# Patient Record
Sex: FEMALE
[De-identification: names, ages, dates, MRNs, and addresses within clinical notes are randomized per-mention and may not be internally consistent; named-entity substitution may affect disease eponyms.]

---

## 2024-06-27 ENCOUNTER — DASHBOARD ENCOUNTERS (OUTPATIENT)
Age: 69
End: 2024-06-27

## 2024-06-27 ENCOUNTER — OFFICE VISIT (OUTPATIENT)
Dept: URBAN - METROPOLITAN AREA CLINIC 113 | Facility: CLINIC | Age: 69
End: 2024-06-27
Payer: COMMERCIAL

## 2024-06-27 VITALS
DIASTOLIC BLOOD PRESSURE: 79 MMHG | HEIGHT: 64 IN | RESPIRATION RATE: 18 BRPM | WEIGHT: 188 LBS | SYSTOLIC BLOOD PRESSURE: 116 MMHG | BODY MASS INDEX: 32.1 KG/M2 | TEMPERATURE: 98.1 F | HEART RATE: 67 BPM

## 2024-06-27 DIAGNOSIS — K64.1 GRADE II HEMORRHOIDS: ICD-10-CM

## 2024-06-27 DIAGNOSIS — K60.2 ANAL FISSURE: ICD-10-CM

## 2024-06-27 PROCEDURE — 99203 OFFICE O/P NEW LOW 30 MIN: CPT | Performed by: STUDENT IN AN ORGANIZED HEALTH CARE EDUCATION/TRAINING PROGRAM

## 2024-06-27 NOTE — HPI-TODAY'S VISIT:
Initial visit 6/27/2024; PCP Dr. Mara Oneill Ms. Bernardo is a 68-year-old female here today to discuss management of hemorrhoids.  She states that she has been having rectal symptoms for the past few weeks, she feels that she has internal hemorrhoids.  She states that she had a colonoscopy within the last 10 years that revealed internal hemorrhoid, she reports no polyps, this report is not available for my review.  Her rectal symptoms include bleeding, itching and burning.  She also states that she feels as if stool can sometimes be hard to pass.

## 2024-06-27 NOTE — PHYSICAL EXAM GASTROINTESTINAL
Abdomen , soft, nontender, nondistended , no guarding or rigidity , no masses palpable , normal bowel sounds , Liver and Spleen , no hepatomegaly present , no hepatosplenomegaly , liver nontender , spleen not palpable , Rectal , anal fissure present at 2'o clock, external skin tags, no external hemorrhoids, internal hemorrhoids palpated, no masses on ARLIN

## 2024-07-02 ENCOUNTER — OFFICE VISIT (OUTPATIENT)
Dept: URBAN - METROPOLITAN AREA CLINIC 113 | Facility: CLINIC | Age: 69
End: 2024-07-02

## 2024-08-26 ENCOUNTER — OFFICE VISIT (OUTPATIENT)
Dept: URBAN - METROPOLITAN AREA CLINIC 113 | Facility: CLINIC | Age: 69
End: 2024-08-26
Payer: COMMERCIAL

## 2024-08-26 VITALS
DIASTOLIC BLOOD PRESSURE: 70 MMHG | HEIGHT: 64 IN | WEIGHT: 189 LBS | BODY MASS INDEX: 32.27 KG/M2 | RESPIRATION RATE: 18 BRPM | SYSTOLIC BLOOD PRESSURE: 109 MMHG | TEMPERATURE: 97.9 F | HEART RATE: 60 BPM

## 2024-08-26 DIAGNOSIS — K60.2 ANAL FISSURE: ICD-10-CM

## 2024-08-26 DIAGNOSIS — K64.1 GRADE II HEMORRHOIDS: ICD-10-CM

## 2024-08-26 PROCEDURE — 99214 OFFICE O/P EST MOD 30 MIN: CPT | Performed by: STUDENT IN AN ORGANIZED HEALTH CARE EDUCATION/TRAINING PROGRAM

## 2024-08-26 NOTE — PHYSICAL EXAM MUSCULOSKELETAL:
normal gait and station , no tenderness or deformities present
no breast tenderness L/no breast tenderness R/no breast lump L/no breast lump R

## 2024-08-26 NOTE — HPI-TODAY'S VISIT:
Initial visit 6/27/2024; PCP Dr. Mara Oneill Ms. Bernardo is a 68-year-old female here today to discuss management of hemorrhoids.  She states that she has been having rectal symptoms for the past few weeks, she feels that she has internal hemorrhoids.  She states that she had a colonoscopy within the last 10 years that revealed internal hemorrhoid, she reports no polyps, this report is not available for my review.  Her rectal symptoms include bleeding, itching and burning.  She also states that she feels as if stool can sometimes be hard to pass. . Follow-up visit 8/26/2024 She continues nifedipine/lidocaine twice a day for over a month however she still feels a sharp, glasslike sensation when having bowel movements.  Her bleeding has improved.

## 2024-08-26 NOTE — PHYSICAL EXAM GASTROINTESTINAL
Abdomen , soft, nontender, nondistended , no guarding or rigidity , no masses palpable , normal bowel sounds , Liver and Spleen , no hepatomegaly present , no hepatosplenomegaly , liver nontender , spleen not palpable , Rectal , anal fissure present at 12'o clock, external skin tags, no external hemorrhoids, internal hemorrhoids palpated, no masses on ARLIN